# Patient Record
Sex: MALE | Race: OTHER | NOT HISPANIC OR LATINO | URBAN - METROPOLITAN AREA
[De-identification: names, ages, dates, MRNs, and addresses within clinical notes are randomized per-mention and may not be internally consistent; named-entity substitution may affect disease eponyms.]

---

## 2021-08-25 ENCOUNTER — EMERGENCY (EMERGENCY)
Facility: HOSPITAL | Age: 21
LOS: 1 days | Discharge: HOME | End: 2021-08-25
Attending: STUDENT IN AN ORGANIZED HEALTH CARE EDUCATION/TRAINING PROGRAM | Admitting: STUDENT IN AN ORGANIZED HEALTH CARE EDUCATION/TRAINING PROGRAM
Payer: MEDICAID

## 2021-08-25 VITALS
SYSTOLIC BLOOD PRESSURE: 116 MMHG | RESPIRATION RATE: 17 BRPM | OXYGEN SATURATION: 100 % | HEART RATE: 90 BPM | DIASTOLIC BLOOD PRESSURE: 63 MMHG | TEMPERATURE: 99 F

## 2021-08-25 PROCEDURE — 99284 EMERGENCY DEPT VISIT MOD MDM: CPT

## 2021-08-25 NOTE — ED ADULT TRIAGE NOTE - CHIEF COMPLAINT QUOTE
pt presents to ED with complaints of abdominal pain, hematemesis and melena x 3 weeks. pt axxo4 in nad was referred to ED by gastroenterologist for endoscopy, denies any cp sob fever chills.

## 2021-08-26 VITALS
DIASTOLIC BLOOD PRESSURE: 75 MMHG | OXYGEN SATURATION: 100 % | HEART RATE: 77 BPM | SYSTOLIC BLOOD PRESSURE: 130 MMHG | RESPIRATION RATE: 15 BRPM

## 2021-08-26 LAB
ALBUMIN SERPL ELPH-MCNC: 5.1 G/DL — HIGH (ref 3.3–5)
ALP SERPL-CCNC: 78 U/L — SIGNIFICANT CHANGE UP (ref 40–120)
ALT FLD-CCNC: 20 U/L — SIGNIFICANT CHANGE UP (ref 4–41)
ANION GAP SERPL CALC-SCNC: 17 MMOL/L — HIGH (ref 7–14)
AST SERPL-CCNC: 19 U/L — SIGNIFICANT CHANGE UP (ref 4–40)
BILIRUB SERPL-MCNC: 0.2 MG/DL — SIGNIFICANT CHANGE UP (ref 0.2–1.2)
BUN SERPL-MCNC: 14 MG/DL — SIGNIFICANT CHANGE UP (ref 7–23)
CALCIUM SERPL-MCNC: 10.3 MG/DL — SIGNIFICANT CHANGE UP (ref 8.4–10.5)
CHLORIDE SERPL-SCNC: 101 MMOL/L — SIGNIFICANT CHANGE UP (ref 98–107)
CO2 SERPL-SCNC: 24 MMOL/L — SIGNIFICANT CHANGE UP (ref 22–31)
CREAT SERPL-MCNC: 1 MG/DL — SIGNIFICANT CHANGE UP (ref 0.5–1.3)
GLUCOSE SERPL-MCNC: 86 MG/DL — SIGNIFICANT CHANGE UP (ref 70–99)
HCT VFR BLD CALC: 45.1 % — SIGNIFICANT CHANGE UP (ref 39–50)
HGB BLD-MCNC: 15.3 G/DL — SIGNIFICANT CHANGE UP (ref 13–17)
MAGNESIUM SERPL-MCNC: 2.2 MG/DL — SIGNIFICANT CHANGE UP (ref 1.6–2.6)
MCHC RBC-ENTMCNC: 29.5 PG — SIGNIFICANT CHANGE UP (ref 27–34)
MCHC RBC-ENTMCNC: 33.9 GM/DL — SIGNIFICANT CHANGE UP (ref 32–36)
MCV RBC AUTO: 86.9 FL — SIGNIFICANT CHANGE UP (ref 80–100)
NRBC # BLD: 0 /100 WBCS — SIGNIFICANT CHANGE UP
NRBC # FLD: 0 K/UL — SIGNIFICANT CHANGE UP
PHOSPHATE SERPL-MCNC: 4.1 MG/DL — SIGNIFICANT CHANGE UP (ref 2.5–4.5)
PLATELET # BLD AUTO: 256 K/UL — SIGNIFICANT CHANGE UP (ref 150–400)
POTASSIUM SERPL-MCNC: 3.8 MMOL/L — SIGNIFICANT CHANGE UP (ref 3.5–5.3)
POTASSIUM SERPL-SCNC: 3.8 MMOL/L — SIGNIFICANT CHANGE UP (ref 3.5–5.3)
PROT SERPL-MCNC: 8.4 G/DL — HIGH (ref 6–8.3)
RBC # BLD: 5.19 M/UL — SIGNIFICANT CHANGE UP (ref 4.2–5.8)
RBC # FLD: 12 % — SIGNIFICANT CHANGE UP (ref 10.3–14.5)
SODIUM SERPL-SCNC: 142 MMOL/L — SIGNIFICANT CHANGE UP (ref 135–145)
WBC # BLD: 6.84 K/UL — SIGNIFICANT CHANGE UP (ref 3.8–10.5)
WBC # FLD AUTO: 6.84 K/UL — SIGNIFICANT CHANGE UP (ref 3.8–10.5)

## 2021-08-26 PROCEDURE — 71046 X-RAY EXAM CHEST 2 VIEWS: CPT | Mod: 26

## 2021-08-26 NOTE — ED PROVIDER NOTE - IV ALTEPASE ADMIN HIDDEN
OVERNIGHT EVENTS:    BRIEF HOSPITAL COURSE:    Present Symptoms:     Dyspnea: 0 1 2 3   Nausea/Vomiting: Yes No  Anxiety:  Yes No  Depression: Yes No  Fatigue: Yes No  Loss of appetite: Yes No    Pain:             Character-            Duration-            Effect-            Factors-            Frequency-            Location-            Severity-    Review of Systems: Reviewed                     Negative:                     Positive:  Unable to obtain due to poor mentation   All others negative    MEDICATIONS  (STANDING):  ascorbic acid 500 milliGRAM(s) Oral daily  atovaquone Suspension 1500 milliGRAM(s) Oral daily  azithromycin  IVPB 500 milliGRAM(s) IV Intermittent every 24 hours  chlorhexidine 0.12% Liquid 15 milliLiter(s) Oral Mucosa every 12 hours  collagenase Ointment 1 Application(s) Topical daily  enoxaparin Injectable 40 milliGRAM(s) SubCutaneous daily  famotidine    Tablet 20 milliGRAM(s) Oral daily  ferrous    sulfate 325 milliGRAM(s) Oral two times a day  methylPREDNISolone sodium succinate Injectable 40 milliGRAM(s) IV Push every 8 hours  multivitamin 1 Tablet(s) Oral daily  OXcarbazepine 600 milliGRAM(s) Oral two times a day  piperacillin/tazobactam IVPB.. 3.375 Gram(s) IV Intermittent every 8 hours  polyethylene glycol 3350 17 Gram(s) Oral at bedtime  senna Syrup 10 milliLiter(s) Oral at bedtime  sodium chloride 0.9%. 1000 milliLiter(s) (83 mL/Hr) IV Continuous <Continuous>  sucralfate suspension 1 Gram(s) Oral every 6 hours    MEDICATIONS  (PRN):  acetaminophen    Suspension .. 650 milliGRAM(s) Oral every 6 hours PRN Temp greater or equal to 38C (100.4F), Mild Pain (1 - 3)  ALBUTerol   0.042% 1.25 milliGRAM(s) Nebulizer four times a day PRN Shortness of Breath and/or Wheezing  aluminum hydroxide/magnesium hydroxide/simethicone Suspension 30 milliLiter(s) Oral every 4 hours PRN Dyspepsia  melatonin 5 milliGRAM(s) Oral at bedtime PRN Insomnia  simethicone drops 40 milliGRAM(s) Oral every 6 hours PRN Gas    PHYSICAL EXAM:    Vital Signs Last 24 Hrs  T(C): 37 (20 Mar 2020 07:40), Max: 37.4 (19 Mar 2020 16:36)  T(F): 98.6 (20 Mar 2020 07:40), Max: 99.3 (19 Mar 2020 16:36)  HR: 108 (20 Mar 2020 10:00) (90 - 124)  BP: 97/55 (20 Mar 2020 10:00) (87/52 - 116/55)  BP(mean): 69 (20 Mar 2020 10:00) (63 - 83)  RR: 44 (20 Mar 2020 10:00) (36 - 49)  SpO2: 90% (20 Mar 2020 10:00) (88% - 96%)    General: anxious     Karnofsky:  20%     HEENT: normal      Lungs: tachypneic. trach     CV: normal      GI: normal. PEG     : normal      MSK: bedbound/wheelchair bound    Skin: no rash    LABS:                      7.6    .83 )-----------( 639      ( 20 Mar 2020 03:38 )             26.0     03-20    136  |  86<L>  |  24.0<H>  ----------------------------<  170<H>  4.3   |  40.0<H>  |  0.28<L>    Ca    8.9      20 Mar 2020 03:38  Mg     2.2     03-19    Urinalysis Basic - ( 19 Mar 2020 16:32 )    Color: Yellow / Appearance: Slightly Turbid / S.020 / pH: x  Gluc: x / Ketone: Negative  / Bili: Negative / Urobili: Negative mg/dL   Blood: x / Protein: 30 mg/dL / Nitrite: Negative   Leuk Esterase: Moderate / RBC: 3-5 /HPF / WBC >50   Sq Epi: x / Non Sq Epi: Occasional / Bacteria: Few    I&O's Summary    19 Mar 2020 07:01  -  20 Mar 2020 07:00  --------------------------------------------------------  IN: 4807 mL / OUT: 1200 mL / NET: 3607 mL    20 Mar 2020 07:01  -  20 Mar 2020 10:57  --------------------------------------------------------  IN: 692 mL / OUT: 195 mL / NET: 497 mL    RADIOLOGY & ADDITIONAL STUDIES:    ADVANCE DIRECTIVES: OVERNIGHT EVENTS: more awake today.     Present Symptoms:     Dyspnea: 1-2   Nausea/Vomiting: No  Anxiety:  Yes   Depression: unable   Fatigue: Yes   Loss of appetite: No    Pain: none             Character-            Duration-            Effect-            Factors-            Frequency-            Location-            Severity-    Review of Systems: Reviewed                  Unable to obtain due to poor mentation   All others negative    MEDICATIONS  (STANDING):  ascorbic acid 500 milliGRAM(s) Oral daily  atovaquone Suspension 1500 milliGRAM(s) Oral daily  azithromycin  IVPB 500 milliGRAM(s) IV Intermittent every 24 hours  chlorhexidine 0.12% Liquid 15 milliLiter(s) Oral Mucosa every 12 hours  collagenase Ointment 1 Application(s) Topical daily  enoxaparin Injectable 40 milliGRAM(s) SubCutaneous daily  famotidine    Tablet 20 milliGRAM(s) Oral daily  ferrous    sulfate 325 milliGRAM(s) Oral two times a day  methylPREDNISolone sodium succinate Injectable 40 milliGRAM(s) IV Push every 8 hours  multivitamin 1 Tablet(s) Oral daily  OXcarbazepine 600 milliGRAM(s) Oral two times a day  piperacillin/tazobactam IVPB.. 3.375 Gram(s) IV Intermittent every 8 hours  polyethylene glycol 3350 17 Gram(s) Oral at bedtime  senna Syrup 10 milliLiter(s) Oral at bedtime  sodium chloride 0.9%. 1000 milliLiter(s) (83 mL/Hr) IV Continuous <Continuous>  sucralfate suspension 1 Gram(s) Oral every 6 hours    MEDICATIONS  (PRN):  acetaminophen    Suspension .. 650 milliGRAM(s) Oral every 6 hours PRN Temp greater or equal to 38C (100.4F), Mild Pain (1 - 3)  ALBUTerol   0.042% 1.25 milliGRAM(s) Nebulizer four times a day PRN Shortness of Breath and/or Wheezing  aluminum hydroxide/magnesium hydroxide/simethicone Suspension 30 milliLiter(s) Oral every 4 hours PRN Dyspepsia  melatonin 5 milliGRAM(s) Oral at bedtime PRN Insomnia  simethicone drops 40 milliGRAM(s) Oral every 6 hours PRN Gas    PHYSICAL EXAM:    Vital Signs Last 24 Hrs  T(C): 37 (20 Mar 2020 07:40), Max: 37.4 (19 Mar 2020 16:36)  T(F): 98.6 (20 Mar 2020 07:40), Max: 99.3 (19 Mar 2020 16:36)  HR: 108 (20 Mar 2020 10:00) (90 - 124)  BP: 97/55 (20 Mar 2020 10:00) (87/52 - 116/55)  BP(mean): 69 (20 Mar 2020 10:00) (63 - 83)  RR: 44 (20 Mar 2020 10:00) (36 - 49)  SpO2: 90% (20 Mar 2020 10:00) (88% - 96%)    General: anxious     Karnofsky:  20%     HEENT: normal      Lungs: tachypneic. trach     CV: normal      GI: normal. PEG     : normal      MSK: bedbound/wheelchair bound    Skin: no rash    LABS:                      7.6    20.83 )-----------( 639      ( 20 Mar 2020 03:38 )             26.0     03-20    136  |  86<L>  |  24.0<H>  ----------------------------<  170<H>  4.3   |  40.0<H>  |  0.28<L>    Ca    8.9      20 Mar 2020 03:38  Mg     2.2     03-19    Urinalysis Basic - ( 19 Mar 2020 16:32 )    Color: Yellow / Appearance: Slightly Turbid / S.020 / pH: x  Gluc: x / Ketone: Negative  / Bili: Negative / Urobili: Negative mg/dL   Blood: x / Protein: 30 mg/dL / Nitrite: Negative   Leuk Esterase: Moderate / RBC: 3-5 /HPF / WBC >50   Sq Epi: x / Non Sq Epi: Occasional / Bacteria: Few    I&O's Summary    19 Mar 2020 07:01  -  20 Mar 2020 07:00  --------------------------------------------------------  IN: 4807 mL / OUT: 1200 mL / NET: 3607 mL    20 Mar 2020 07:01  -  20 Mar 2020 10:57  --------------------------------------------------------  IN: 692 mL / OUT: 195 mL / NET: 497 mL    RADIOLOGY & ADDITIONAL STUDIES:     < from: Xray Chest 1 View- PORTABLE-Routine (20 @ 13:45) >  IMPRESSION: Pulmonary fibrosis. Small right pneumothorax, unchanged.    < end of copied text >      ADVANCE DIRECTIVES: now DNR after this visit show

## 2021-08-26 NOTE — ED PROVIDER NOTE - NS ED ROS FT
REVIEW OF SYSTEMS:    CONSTITUTIONAL: No weakness, fevers or chills  EYES/ENT: No visual changes;  No vertigo or throat pain   NECK: No pain or stiffness  RESPIRATORY: No cough, wheezing, hemoptysis; No shortness of breath  CARDIOVASCULAR: No chest pain or palpitations  GASTROINTESTINAL: No abdominal or epigastric pain. ++ daily nausea, vomiting, hematemesis; No diarrhea or constipation. ++melena  GENITOURINARY: No dysuria, frequency or hematuria  NEUROLOGICAL: No numbness or weakness  SKIN: No itching, rashes

## 2021-08-26 NOTE — ED PROVIDER NOTE - PHYSICAL EXAMINATION
Vital Signs Last 24 Hrs  T(C): 36.4 (26 Aug 2021 00:24), Max: 37.1 (25 Aug 2021 20:48)  T(F): 97.6 (26 Aug 2021 00:24), Max: 98.8 (25 Aug 2021 20:48)  HR: 77 (26 Aug 2021 03:19) (74 - 90)  BP: 130/75 (26 Aug 2021 03:19) (116/63 - 130/75)  RR: 15 (26 Aug 2021 03:19) (14 - 17)  SpO2: 100% (26 Aug 2021 03:19) (100% - 100%)    GENERAL: NAD, lying in bed comfortably  HEAD:  Atraumatic, Normocephalic  EYES: EOMI, PERRLA, conjunctiva and sclera clear  ENT: Moist mucous membranes  NECK: Supple, No JVD  CHEST/LUNG: Clear to auscultation bilaterally; No rales, rhonchi, wheezing, or rubs. Unlabored respirations  HEART: Regular rate and rhythm; No murmurs, rubs, or gallops  ABDOMEN: Bowel sounds present; Soft, Nontender, Nondistended.  EXTREMITIES:  2+ Peripheral Pulses, brisk capillary refill. No clubbing, cyanosis, or edema  NERVOUS SYSTEM:  Alert & Oriented X3, speech clear. No deficits   MSK: FROM all 4 extremities, full and equal strength  SKIN: No rashes or lesions

## 2021-08-26 NOTE — ED PROVIDER NOTE - CARE PROVIDER_API CALL
Dr. Brennan Orellana,   Dr. Brennan Orellana  [address] [place] [address]  [phone]  Please see your GI doctor within 1-3 days from discharge from the hospital  Phone: [phone]  Fax: (   )    -  Follow Up Time: 1-3 Days

## 2021-08-26 NOTE — ED PROVIDER NOTE - PROVIDER TOKENS
FREE:[LAST:[Dr. Brennan Orellana],PHONE:[phone]],FAX:[(   )    -],ADDRESS:[Dr. Brennan Orellana  [address] [place] [address]  [phone]  Please see your GI doctor within 1-3 days from discharge from the hospital],FOLLOWUP:[1-3 Days]]

## 2021-08-26 NOTE — ED PROVIDER NOTE - OBJECTIVE STATEMENT
19 yo M with PMH of mild intermittent asthma presents with hematemesis (spotting) and 21 yo M with PMH of mild intermittent asthma presents with hematemesis (spotting) and melena for past 3 weeks since an episode of binge drinking 14 shots per patient, he does not drink daily but does binge drink every 2-3 hours. He was told by his GI doctor to come into the hospital fo evaluation.

## 2021-08-26 NOTE — ED PROVIDER NOTE - PROGRESS NOTE DETAILS
Patient wishes to leave the emergency department at this time.  The patient understands that they are leaving against medical advice despite the risk of missing a potentially serious diagnosis which may lead to injury, disability and/or death.  The patient demonstrates understanding of all risks, is awake and alert and demonstrates competence to make medical decisions.  I was unable to convince the patient to stay for further workup and monitoring.  The patient was given an opportunity to ask any questions.   The patient understands that they may return at any time if desired.  I discussed the importance of close, prompt medical follow-up. - Rosaline Pritchard, DO

## 2021-08-26 NOTE — ED PROVIDER NOTE - CLINICAL SUMMARY MEDICAL DECISION MAKING FREE TEXT BOX
20M pmh asthma p/w 3 weeks of vomiting with blood and melena in setting of binge drinking prior to onset.  Pt seen by out patient GI doc who sent to ED for urgent endoscopy and GI eval.  Pt with +epigastric TTP, otherwise well appearing, heart rrr, lungs cta.  Plan for labs, Ct abd and likely admission.  - Rosaline Pritchard,  20M pmh asthma p/w 3 weeks of vomiting with blood and melena in setting of binge drinking prior to onset.  Pt seen by out patient GI doc who sent to ED for urgent endoscopy and GI eval.  Pt with +epigastric TTP, otherwise well appearing, heart rrr, lungs cta.  Plan for labs, Ct abd and likely admission.  - Rosaline Pritchard DO  Pt signed out AMA

## 2024-09-17 ENCOUNTER — EMERGENCY (EMERGENCY)
Facility: HOSPITAL | Age: 24
LOS: 1 days | Discharge: HOME | End: 2024-09-17
Attending: STUDENT IN AN ORGANIZED HEALTH CARE EDUCATION/TRAINING PROGRAM
Payer: MEDICAID

## 2024-09-17 VITALS
RESPIRATION RATE: 18 BRPM | HEART RATE: 92 BPM | TEMPERATURE: 98 F | DIASTOLIC BLOOD PRESSURE: 90 MMHG | HEIGHT: 66 IN | WEIGHT: 149.91 LBS | OXYGEN SATURATION: 99 % | SYSTOLIC BLOOD PRESSURE: 133 MMHG

## 2024-09-17 VITALS
SYSTOLIC BLOOD PRESSURE: 145 MMHG | OXYGEN SATURATION: 100 % | HEART RATE: 85 BPM | TEMPERATURE: 98 F | RESPIRATION RATE: 20 BRPM | DIASTOLIC BLOOD PRESSURE: 75 MMHG

## 2024-09-17 LAB
ADD ON TEST-SPECIMEN IN LAB: SIGNIFICANT CHANGE UP
ALBUMIN SERPL ELPH-MCNC: 4.6 G/DL — SIGNIFICANT CHANGE UP (ref 3.3–5)
ALP SERPL-CCNC: 75 U/L — SIGNIFICANT CHANGE UP (ref 40–120)
ALT FLD-CCNC: 79 U/L — HIGH (ref 10–45)
ANION GAP SERPL CALC-SCNC: 15 MMOL/L — SIGNIFICANT CHANGE UP (ref 5–17)
AST SERPL-CCNC: 34 U/L — SIGNIFICANT CHANGE UP (ref 10–40)
BASOPHILS # BLD AUTO: 0.04 K/UL — SIGNIFICANT CHANGE UP (ref 0–0.2)
BASOPHILS NFR BLD AUTO: 0.5 % — SIGNIFICANT CHANGE UP (ref 0–2)
BILIRUB SERPL-MCNC: 0.2 MG/DL — SIGNIFICANT CHANGE UP (ref 0.2–1.2)
BUN SERPL-MCNC: 11 MG/DL — SIGNIFICANT CHANGE UP (ref 7–23)
CALCIUM SERPL-MCNC: 10.1 MG/DL — SIGNIFICANT CHANGE UP (ref 8.4–10.5)
CHLORIDE SERPL-SCNC: 101 MMOL/L — SIGNIFICANT CHANGE UP (ref 96–108)
CO2 SERPL-SCNC: 23 MMOL/L — SIGNIFICANT CHANGE UP (ref 22–31)
CREAT SERPL-MCNC: 0.88 MG/DL — SIGNIFICANT CHANGE UP (ref 0.5–1.3)
EGFR: 123 ML/MIN/1.73M2 — SIGNIFICANT CHANGE UP
EOSINOPHIL # BLD AUTO: 0.1 K/UL — SIGNIFICANT CHANGE UP (ref 0–0.5)
EOSINOPHIL NFR BLD AUTO: 1.3 % — SIGNIFICANT CHANGE UP (ref 0–6)
FLUAV AG NPH QL: SIGNIFICANT CHANGE UP
FLUBV AG NPH QL: SIGNIFICANT CHANGE UP
GAS PNL BLDV: SIGNIFICANT CHANGE UP
GLUCOSE SERPL-MCNC: 90 MG/DL — SIGNIFICANT CHANGE UP (ref 70–99)
HCT VFR BLD CALC: 43.8 % — SIGNIFICANT CHANGE UP (ref 39–50)
HGB BLD-MCNC: 14.7 G/DL — SIGNIFICANT CHANGE UP (ref 13–17)
IMM GRANULOCYTES NFR BLD AUTO: 0.6 % — SIGNIFICANT CHANGE UP (ref 0–0.9)
LIDOCAIN IGE QN: 26 U/L — SIGNIFICANT CHANGE UP (ref 7–60)
LYMPHOCYTES # BLD AUTO: 2.58 K/UL — SIGNIFICANT CHANGE UP (ref 1–3.3)
LYMPHOCYTES # BLD AUTO: 33.4 % — SIGNIFICANT CHANGE UP (ref 13–44)
MCHC RBC-ENTMCNC: 29.3 PG — SIGNIFICANT CHANGE UP (ref 27–34)
MCHC RBC-ENTMCNC: 33.6 GM/DL — SIGNIFICANT CHANGE UP (ref 32–36)
MCV RBC AUTO: 87.3 FL — SIGNIFICANT CHANGE UP (ref 80–100)
MONOCYTES # BLD AUTO: 0.68 K/UL — SIGNIFICANT CHANGE UP (ref 0–0.9)
MONOCYTES NFR BLD AUTO: 8.8 % — SIGNIFICANT CHANGE UP (ref 2–14)
NEUTROPHILS # BLD AUTO: 4.27 K/UL — SIGNIFICANT CHANGE UP (ref 1.8–7.4)
NEUTROPHILS NFR BLD AUTO: 55.4 % — SIGNIFICANT CHANGE UP (ref 43–77)
NRBC # BLD: 0 /100 WBCS — SIGNIFICANT CHANGE UP (ref 0–0)
PLATELET # BLD AUTO: 258 K/UL — SIGNIFICANT CHANGE UP (ref 150–400)
POTASSIUM SERPL-MCNC: 3.9 MMOL/L — SIGNIFICANT CHANGE UP (ref 3.5–5.3)
POTASSIUM SERPL-SCNC: 3.9 MMOL/L — SIGNIFICANT CHANGE UP (ref 3.5–5.3)
PROT SERPL-MCNC: 8.2 G/DL — SIGNIFICANT CHANGE UP (ref 6–8.3)
RBC # BLD: 5.02 M/UL — SIGNIFICANT CHANGE UP (ref 4.2–5.8)
RBC # FLD: 12.6 % — SIGNIFICANT CHANGE UP (ref 10.3–14.5)
RSV RNA NPH QL NAA+NON-PROBE: SIGNIFICANT CHANGE UP
SARS-COV-2 RNA SPEC QL NAA+PROBE: SIGNIFICANT CHANGE UP
SODIUM SERPL-SCNC: 139 MMOL/L — SIGNIFICANT CHANGE UP (ref 135–145)
TROPONIN T, HIGH SENSITIVITY RESULT: <6 NG/L — SIGNIFICANT CHANGE UP (ref 0–51)
TSH SERPL-MCNC: 1.95 UIU/ML — SIGNIFICANT CHANGE UP (ref 0.27–4.2)
WBC # BLD: 7.72 K/UL — SIGNIFICANT CHANGE UP (ref 3.8–10.5)
WBC # FLD AUTO: 7.72 K/UL — SIGNIFICANT CHANGE UP (ref 3.8–10.5)

## 2024-09-17 PROCEDURE — 84484 ASSAY OF TROPONIN QUANT: CPT

## 2024-09-17 PROCEDURE — 85610 PROTHROMBIN TIME: CPT

## 2024-09-17 PROCEDURE — 96374 THER/PROPH/DIAG INJ IV PUSH: CPT | Mod: XU

## 2024-09-17 PROCEDURE — 71046 X-RAY EXAM CHEST 2 VIEWS: CPT | Mod: 26

## 2024-09-17 PROCEDURE — 85730 THROMBOPLASTIN TIME PARTIAL: CPT

## 2024-09-17 PROCEDURE — 82435 ASSAY OF BLOOD CHLORIDE: CPT

## 2024-09-17 PROCEDURE — 93005 ELECTROCARDIOGRAM TRACING: CPT

## 2024-09-17 PROCEDURE — 83690 ASSAY OF LIPASE: CPT

## 2024-09-17 PROCEDURE — 84132 ASSAY OF SERUM POTASSIUM: CPT

## 2024-09-17 PROCEDURE — 85018 HEMOGLOBIN: CPT

## 2024-09-17 PROCEDURE — 80053 COMPREHEN METABOLIC PANEL: CPT

## 2024-09-17 PROCEDURE — 85379 FIBRIN DEGRADATION QUANT: CPT

## 2024-09-17 PROCEDURE — 82803 BLOOD GASES ANY COMBINATION: CPT

## 2024-09-17 PROCEDURE — 83880 ASSAY OF NATRIURETIC PEPTIDE: CPT

## 2024-09-17 PROCEDURE — 71046 X-RAY EXAM CHEST 2 VIEWS: CPT

## 2024-09-17 PROCEDURE — 99285 EMERGENCY DEPT VISIT HI MDM: CPT | Mod: 25

## 2024-09-17 PROCEDURE — 82330 ASSAY OF CALCIUM: CPT

## 2024-09-17 PROCEDURE — 85025 COMPLETE CBC W/AUTO DIFF WBC: CPT

## 2024-09-17 PROCEDURE — 83735 ASSAY OF MAGNESIUM: CPT

## 2024-09-17 PROCEDURE — 85014 HEMATOCRIT: CPT

## 2024-09-17 PROCEDURE — 84443 ASSAY THYROID STIM HORMONE: CPT

## 2024-09-17 PROCEDURE — 83605 ASSAY OF LACTIC ACID: CPT

## 2024-09-17 PROCEDURE — 84295 ASSAY OF SERUM SODIUM: CPT

## 2024-09-17 PROCEDURE — 82947 ASSAY GLUCOSE BLOOD QUANT: CPT

## 2024-09-17 PROCEDURE — 87637 SARSCOV2&INF A&B&RSV AMP PRB: CPT

## 2024-09-17 RX ORDER — ACETAMINOPHEN 325 MG/1
1000 TABLET ORAL ONCE
Refills: 0 | Status: COMPLETED | OUTPATIENT
Start: 2024-09-17 | End: 2024-09-17

## 2024-09-17 RX ORDER — SODIUM CHLORIDE 9 MG/ML
1000 INJECTION INTRAMUSCULAR; INTRAVENOUS; SUBCUTANEOUS ONCE
Refills: 0 | Status: COMPLETED | OUTPATIENT
Start: 2024-09-17 | End: 2024-09-17

## 2024-09-17 RX ADMIN — SODIUM CHLORIDE 1000 MILLILITER(S): 9 INJECTION INTRAMUSCULAR; INTRAVENOUS; SUBCUTANEOUS at 03:21

## 2024-09-17 RX ADMIN — ACETAMINOPHEN 400 MILLIGRAM(S): 325 TABLET ORAL at 03:21

## 2024-09-17 NOTE — ED PROVIDER NOTE - PATIENT PORTAL LINK FT
You can access the FollowMyHealth Patient Portal offered by [facility] by registering at the following website: [email or web address]. By joining [employer]’s FollowMyHealth portal, you will also be able to view your health information using other applications (apps) compatible with our system.

## 2024-09-17 NOTE — ED PROVIDER NOTE - CLINICAL SUMMARY MEDICAL DECISION MAKING FREE TEXT BOX
EM PGY-2/DO Carlos: 23-year-old male PMHx GERD presents to ED CC 2-week onset left-sided chest discomfort that is constant in nature, intermittently becomes tight and burning in nature and radiates to his left arm.  States he will occasionally feel associated palpitations.  Denies any modifying factors. Denies any associated shortness of breath, fevers, NVD, abdominal pain, lightheadedness, dizziness.  States does not feel like his GERD. Traveled from [place] 1wk ago. States he has had similar symptoms in the past however has never seen a specialist.  No other complaints at this time.  VSS.  On exam patient is well-appearing in NAD, speaking in full sentences.  CV RRR, lungs CTAB.  Abdomen soft, nontender.  Upper and lower extremity pulses 2+ bilaterally. Extremities equal in size B/L, nonedematous, no calf ttp, no clinical s/s of DVT. EKG sinus tachycardia w/ nml intervals, no ST changes or Twave inversions. DDx low suspicion for ACS, differential includes infectious, metabolic, endocrine disturbances. Low suspicion PE. Orders reflect differentials.  Reeval to dispo, anticipate discharge, will offer cardiology follow-up.

## 2024-09-17 NOTE — ED ADULT NURSE NOTE - OBJECTIVE STATEMENT
24 yo Male pmhx GERD presents to the ED with report of epigastric chest discomfort x2 wks. Upon assessment, pt is a&Ox4, speaking in full coherent sentences, +chest pain, pt placed on CM, no SOB, abd soft and nontender upon palpation, pt moving upper and lower extremities without difficulty. Iv placed. Pt reports feeling flu-like symptoms the past few days. Pt reports being seen in ER in [place] last week with negative EKG and negative echo, discharged home with follow up and cardiologist referral. Afebrile orally. Bed locked in lowest position, safety and comfort measures maintained.

## 2024-09-17 NOTE — ED ADULT NURSE NOTE - NSFALLUNIVINTERV_ED_ALL_ED
Bed/Stretcher in lowest position, wheels locked, appropriate side rails in place/Call bell, personal items and telephone in reach/Instruct patient to call for assistance before getting out of bed/chair/stretcher/Non-slip footwear applied when patient is off stretcher/[place] to call system/Physically safe environment - no spills, clutter or unnecessary equipment/Purposeful proactive rounding/Room/bathroom lighting operational, light cord in reach

## 2024-09-17 NOTE — ED PROVIDER NOTE - PHYSICAL EXAMINATION
General: NAD. Nontoxic, well appearing. Speaking in full sentences with appropriate phonation.  Eyes: EOMI. Conjunctiva/sclera clear  Cardiac: RRR. No MGR appreciated.  Pulmonary: CTAB. No increased WOB. No wheezes or crackles.  Abdominal: Soft, nontender, nondistended, no peritoneal signs.  Neurologic: No gross focal sensory or motor deficits. Moves all 4 extremities during encounter.  Musculoskeletal: Strength appropriate in all 4 extremities for age with no limited ROM. No visible deformities or extremity swelling. UE/LE pulses 2+ b/l.  Skin: Color appropriate for race. Intact, warm, and well-perfused. No visible lesions or bruising.  Psychiatric: Mood and affect congruent

## 2024-09-17 NOTE — ED PROVIDER NOTE - ATTENDING CONTRIBUTION TO CARE
Mario Miranda MD (Attending Physician):    I performed a history and physical exam of the patient and discussed their management with the resident/fellow/ACP/student. I have reviewed the resident/fellow/ACP/student note and agree with the documented findings and plan of care, except as noted. I have personally performed a substantive portion of the visit including all aspects of the medical decision making. My medical decision making and observations are found below. Please refer to any progress notes for updates on clinical course.    HPI:  23-year-old male with PMHx of GERD presents with 2 weeks of left-sided chest discomfort that is constant in nature, intermittently becomes tight and burning in nature, and radiates to his left arm.  States he will occasionally feel associated palpitations.  Denies any modifying factors. Denies any associated shortness of breath, fevers, N/V/D, abdominal pain, lightheadedness, dizziness, leg swelling.  States his symptoms do not feel like his typical GERD symptoms. Traveled from [place] 1 week ago. States he has had similar symptoms in the past however has never seen a cardiologist. No other complaints at this time. No prior hx of DVT/PE. No prior hx of MI.    PE:  GEN - NAD, well appearing, A&Ox3  HEAD - NC/AT  EYES - PERRL, EOMI  ENT - Airway patent, mucous membranes moist  PULMONARY - CTA b/l, symmetric breath sounds, no W/R/R  CARDIAC - +S1S2, +NSR but tachy to 100s, no M/G/R, no JVD  CHEST - B/l chest wall NT  ABDOMEN - +BS, ND, NT, soft, no guarding, no rebound, no masses, no rigidity   - No CVA TTP b/l  EXTREMITIES - FROM, symmetric pulses, no edema. B/l calves NT.  SKIN - No rash or bruising  NEUROLOGIC - Alert, speech clear, CN II-XII grossly intact, no pronator drift, normal gait, strength and sensation grossly intact, FTN negative b/l  PSYCH - Normal mood/affect, normal insight    MDM:  DDx includes, but not limited to: ACS, PE, PTX, PNA, viral syndrome, pancreatitis, GERD, electrolyte derangement, hyperthyroidism. ekg, cxr, labs including troponin and d-dimer, tylenol, IVF. Dispo pending w/u.

## 2024-09-17 NOTE — ED PROVIDER NOTE - PROGRESS NOTE DETAILS
EM PGY-2/DO Carlos: I have discussed all results, discharge instructions, strict return precautions and need for follow up with patient. They have verbalized understanding and agreement to plan at this time. Offered cardiology follow up. Amendable to discharge. EM PGY-2/DO Carlos: I have discussed all results, discharge instructions, strict return precautions and need for follow up with patient. They have verbalized understanding and agreement to plan at this time. Offered cardiology follow up. Amenable to discharge.

## 2024-09-17 NOTE — ED PROVIDER NOTE - NSFOLLOWUPINSTRUCTIONS_ED_ALL_ED_FT
You were evaluated in the Emergency Department today for chest pain. Your evaluation has shown no signs of medical conditions requiring emergent intervention at this time, however we recommend that you follow up with your primary care physician or your cardiologist as soon as possible for further testing as an outpatient.    Please schedule an appointment for follow up with your primary care physician as soon as possible. I have also notified someone in our system to contact you within the next 2-3 business days to help set up an appointment with a Cardiologist. Please make this appointment as soon as you are able to. All of your labs and imaging results are included in this discharge paperwork. Please bring this packet with you whenever you follow up with your doctors.    Return to the Emergency Department if you experience worsening or uncontrolled chest pain, shortness of breath, lightheadedness, feeling faint, nausea, vomiting, or any other concerning symptoms.    Thank you for choosing us for your care today.

## 2024-09-17 NOTE — ED PROVIDER NOTE - NS ED MD DISPO DISCHARGE CCDA
-- DO NOT REPLY / DO NOT REPLY ALL --  -- Message is from [employer] ([employer]) --    General Patient Message: swollen ankle turning blue ,  Right ankle  painful to walk discolored      Caller Information       Type Contact Phone/Fax    07/07/2023 12:20 PM CDT Phone (Incoming) Ruma Juárez (Self) [phone] (H)        Alternative phone number: no    Can a detailed message be left? Yes    Message Turnaround:     Is it Working Hours? Yes - Working Hours     [place]:    Please give this turnaround time to the caller:   \"This message will be sent to [state Provider's name]. The clinical team will fulfill your request as soon as they review your message.\"                 Patient/Caregiver provided printed discharge information.

## 2024-09-18 ENCOUNTER — APPOINTMENT (OUTPATIENT)
Dept: CARDIOLOGY | Facility: CLINIC | Age: 24
End: 2024-09-18
Payer: MEDICAID

## 2024-09-18 VITALS
HEIGHT: 67 IN | OXYGEN SATURATION: 98 % | WEIGHT: 173 LBS | SYSTOLIC BLOOD PRESSURE: 129 MMHG | HEART RATE: 85 BPM | BODY MASS INDEX: 27.15 KG/M2 | DIASTOLIC BLOOD PRESSURE: 77 MMHG

## 2024-09-18 DIAGNOSIS — R07.89 OTHER CHEST PAIN: ICD-10-CM

## 2024-09-18 DIAGNOSIS — Z83.79 FAMILY HISTORY OF OTHER DISEASES OF THE DIGESTIVE SYSTEM: ICD-10-CM

## 2024-09-18 DIAGNOSIS — Z78.9 OTHER SPECIFIED HEALTH STATUS: ICD-10-CM

## 2024-09-18 DIAGNOSIS — Z87.09 PERSONAL HISTORY OF OTHER DISEASES OF THE RESPIRATORY SYSTEM: ICD-10-CM

## 2024-09-18 DIAGNOSIS — Z82.49 FAMILY HISTORY OF ISCHEMIC HEART DISEASE AND OTHER DISEASES OF THE CIRCULATORY SYSTEM: ICD-10-CM

## 2024-09-18 PROBLEM — Z00.00 ENCOUNTER FOR PREVENTIVE HEALTH EXAMINATION: Status: ACTIVE | Noted: 2024-09-18

## 2024-09-18 PROBLEM — K21.9 GASTRO-ESOPHAGEAL REFLUX DISEASE WITHOUT ESOPHAGITIS: Chronic | Status: ACTIVE | Noted: 2024-09-17

## 2024-09-18 PROCEDURE — 99203 OFFICE O/P NEW LOW 30 MIN: CPT

## 2024-09-18 NOTE — DISCUSSION/SUMMARY
[FreeTextEntry1] : In summary,  is a 23-year-old male with a 2-week history of left-sided chest pressure.  His exam shows regular rhythm, clear lungs, normal blood pressure, and a normal cardiac exam.  An EKG done in the emergency room shows sinus tachycardia and is within normal limits.  The cause of his pain is unclear.  It is very chronic and therefore atypical for ischemia.  States is made worse with exercise he will be scheduled for stress test.

## 2024-09-18 NOTE — HISTORY OF PRESENT ILLNESS
[FreeTextEntry1] : 23-year-old male being seen in cardiac evaluation because of a 2-week history of chest pain.  He is in good general health with no prior history of heart disease or any other significant medical problems.  The pain is described as a pressing sensation in the lateral left chest radiating to the left arm and at times into the neck.  It started suddenly while he was driving in [place] 2 weeks ago.  He went to a local emergency room where he had a thorough evaluation including an echocardiogram and was told nothing was found.  The pain has persisted to variable intensity ever since.  It is worse with physical activity, but may also occur while he is sitting still.  2 days ago he again had an increase in his symptoms and went to the emergency room at [facility].  An EKG, chest x-ray, troponin was all within normal limits.  He has no known risk factors for CAD.  He does have a history of scoliosis with back pain, but has never had similar symptoms in the past.

## 2024-09-19 ENCOUNTER — TRANSCRIPTION ENCOUNTER (OUTPATIENT)
Age: 24
End: 2024-09-19

## 2024-09-24 ENCOUNTER — APPOINTMENT (OUTPATIENT)
Dept: CARDIOLOGY | Facility: CLINIC | Age: 24
End: 2024-09-24
Payer: MEDICAID

## 2024-09-24 PROCEDURE — 93015 CV STRESS TEST SUPVJ I&R: CPT

## 2024-12-25 PROBLEM — F10.90 ALCOHOL USE: Status: ACTIVE | Noted: 2024-09-18

## 2025-02-26 NOTE — ED ADULT NURSE NOTE - IS THE PATIENT ABLE TO BE SCREENED?
REFERRAL APPROVAL NOTICE         Sent on February 26, 2025                   Gaby Trevonheidi  [address]  [facility] [address]                   Dear Ms. Fink,    After a careful review of the medical information and benefit coverage, [facility] has processed your referral. See below for additional details.    If applicable, you must be actively enrolled with your insurance for coverage of the authorized service. If you have any questions regarding your coverage, please contact your insurance directly.    REFERRAL INFORMATION   Referral #:  [id number]  Referred-To Provider    Referred-By Provider:  Oral & Maxillofacial Surgery    MARLEY Mcgarry DANIEL W      [address]  [facility] [address]  [phone] [address]  [facility] [id number] [phone]    Referral Start Date:  02/24/2025  Referral End Date:   02/24/2026             SCHEDULING  If you do not already have an appointment, please call [phone] to make an appointment.     MORE INFORMATION  If you do not already have a [employer] account, sign up at: [email or web address]  You can access your medical information, make appointments, see lab results, billing information, and more.  If you have questions regarding this referral, please contact  the [facility] Referrals department at:             [phone]. Monday - Friday 8:00AM - 5:00PM.     Sincerely,    [facility]    
Yes

## 2025-03-11 NOTE — ED ADULT NURSE NOTE - OBJECTIVE STATEMENT
Opt out Pt. A/O x4, ambulatory, presenting to the ED with abdominal pain, dark stools and hematemesis. PMH of asthma. Pt. states three weeks ago he began to throw up blood and then noticed his stools were dark,. Endorses nausea. Denies SOB, chest pain, dizziness, palpitations, fever and urinary symptoms. Abdomen is round, hard, and tender to palpation in LUQ. Hypoactive bowel sounds heard in all 4 quadrants. Placed on cardiac monitor, NSR. 20 gauge IV in left AC. Will continue to monitor.

## 2025-06-13 NOTE — ED PROVIDER NOTE - WR ORDER DATE AND TIME 1
[FreeTextEntry1] : I, Isha Parikh, am scribing for and the presence of Dr. Acosta the following sections: HPI, PMH,Family/social history, ROS, Physical Exam, Assessment / Plan.   I, Gabriele Acosta, personally performed the services described in the documentation, reviewed the documentation recorded by the scribe in my presence and it accurately and completely records my words and actions.    26-Aug-2021 02:00